# Patient Record
Sex: MALE | Race: OTHER
[De-identification: names, ages, dates, MRNs, and addresses within clinical notes are randomized per-mention and may not be internally consistent; named-entity substitution may affect disease eponyms.]

---

## 2022-05-29 ENCOUNTER — HOSPITAL ENCOUNTER (EMERGENCY)
Dept: HOSPITAL 61 - ER | Age: 42
Discharge: HOME | End: 2022-05-29
Payer: COMMERCIAL

## 2022-05-29 VITALS — WEIGHT: 242.51 LBS | BODY MASS INDEX: 38.97 KG/M2 | HEIGHT: 66 IN

## 2022-05-29 VITALS — SYSTOLIC BLOOD PRESSURE: 146 MMHG | DIASTOLIC BLOOD PRESSURE: 78 MMHG

## 2022-05-29 DIAGNOSIS — S90.02XA: ICD-10-CM
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
## 2022-05-29 DIAGNOSIS — S60.212A: ICD-10-CM

## 2022-05-29 DIAGNOSIS — M54.59: ICD-10-CM
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
## 2022-05-29 DIAGNOSIS — S60.211A: ICD-10-CM

## 2022-05-29 DIAGNOSIS — S90.01XA: ICD-10-CM

## 2022-05-29 DIAGNOSIS — S16.1XXA: Primary | ICD-10-CM
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
## 2022-05-29 PROCEDURE — 72131 CT LUMBAR SPINE W/O DYE: CPT

## 2022-05-29 PROCEDURE — 96372 THER/PROPH/DIAG INJ SC/IM: CPT

## 2022-05-29 PROCEDURE — 99284 EMERGENCY DEPT VISIT MOD MDM: CPT

## 2022-05-29 NOTE — RAD
CT LUMBAR SPINE WO



History: Fall down steps on to buttocks. Low back pain.



Technique: Noncontrast CT was performed of the lumbar spine. Multiplanar reconstructions were perform
ed.



Comparison: None



Findings: 

There are 5 nonrib-bearing lumbar vertebral segments. The L5 segment is partially sacralized with joceline
imentary disc and sacralization of the transverse processes.



There is no fracture or dislocation. Normal vertebral body heights are maintained. Mild central disc 
protrusion at L4-L5. Mild to moderate left neural foraminal stenosis at L4-L5.



Paraspinal soft tissues are unremarkable.



Impression: 

1.  No acute osseous abnormality in the lumbar spine.

2.  Degenerative changes greatest at L4-L5 central disc protrusion and left neural foraminal narrowin
g.

3.  Partial sacralization of the L5 vertebral body.





Exposure: One or more of the following individualized dose reduction techniques were utilized for thi
s examination:  

1. Automated exposure control

2. Adjustment of the mA and/or kV according to patient size

3. Use of iterative reconstruction technique.



Electronically signed by: Giles Xiong MD (5/29/2022 5:02 PM) [id number]

## 2022-05-29 NOTE — PHYS DOC
Past Medical History


Past Surgical History:  No Surgical History





General Adult


EDM:


Chief Complaint:  MECHANICAL FALL





HPI:


HPI:





Patient is a 41-year-old male who presents to the emergency department reporting

that while at work yesterday he slipped on a wet surface while walking down 

steps, states he fell down approximately 3-4 steps landing on his buttocks and 

while trying to catch his fall injured his upper extremities.  Patient states he

then stood back up and attempted to continue walking down the steps and slipped 

once again on the wet surface falling down approximately 3-4 steps, patient 

denies striking his head, denies loss of consciousness.  Patient does report 

left-sided neck pain, low back pain, bilateral upper extremity pain, bilateral 

lower extremity pain.  Patient reports that he did rhina 3 over-the-counter 

ibuprofens at 5 AM with minimal relief in pain, currently rates his pain scale 

at a 10 out of 10.  Patient denies taking prescription pain medications.  

Patient denies dizziness, syncopal or near syncopal episodes, denies chest pain 

or chest discomfort.  Patient denies a history of IV drug use, 

immunosuppression, cancers, fever/chills, numbness or tingling to his buttocks 

or genitals, bowel/bladder incontinence/retention.  Patient denies other 

physical complaints or physical concerns.





Review of Systems:


Review of Systems:


14 body systems of review of systems have been reviewed.  See HPI for pertinent 

positives and negative responses, otherwise all other systems are negative, nonp

ertinent or noncontributory.


Constitutional: Negative except as outlined in HPI above.


Skin: Negative except as outlined in HPI above.


Eyes:   Negative except as outlined in HPI above.


HENT: Negative except as outlined in HPI above.


Respiratory:   Negative except as outlined in HPI above.


Cardiovascular:   Negative except as outlined in HPI above.


GI:   Negative except as outlined in HPI above.


:  Negative except as outlined in HPI above.


Musculoskeletal:   Negative except as outlined in HPI above.


Integument:   Negative except as outlined in HPI above.


Neurologic:   Negative except as outlined in HPI above.


Endocrine:   Negative except as outlined in HPI above.


Lymphatic:  Negative except as outlined in HPI above.


Psychiatric:  Negative except as outlined in HPI above.





Heart Score:


C/O Chest Pain:  No


Risk Factors:


Risk Factors:  DM, Current or recent (<one month) smoker, HTN, HLP, family 

history of CAD, obesity.


Risk Scores:


Score 0 - 3:  2.5% MACE over next 6 weeks - Discharge Home


Score 4 - 6:  20.3% MACE over next 6 weeks - Admit for Clinical Observation


Score 7 - 10:  72.7% MACE over next 6 weeks - Early Invasive Strategies





Current Medications:





Current Medications








 Medications


  (Trade)  Dose


 Ordered  Sig/Harmony  Start Time


 Stop Time Status Last Admin


Dose Admin


 


 Ketorolac


 Tromethamine


  (Toradol Im)  60 mg  1X  ONCE  5/29/22 15:15


 5/29/22 15:16 DC 5/29/22 15:25


60 MG











Allergies:


Allergies:





Allergies








Coded Allergies Type Severity Reaction Last Updated Verified


 


  No Known Drug Allergies    5/29/22 No











Physical Exam:


PE:





Constitutional: Well developed, well nourished, no acute distress, non-toxic 

appearance.  41-year-old male in no apparent distress.


HENT: Normocephalic, atraumatic.  There is no malocclusion, no raccoon eyes, no 

battles sign.  Patient speaking in normal voice tones.


Eyes: Conjunctiva normal, no discharge.


Neck: Normal range of motion, no stridor.  There is no C-spine tenderness, there

is pain to the muscular structures of the left side of neck.


Cardiovascular: No cyanosis appreciated, distal cap refill less than 2 seconds.


Lungs & Thorax: Patient is in no respiratory distress, no audible adventitious 

lung sounds appreciated.


Abdomen: Nontender, no abnormalities noted.


Skin: Warm, dry, no erythema, no rash.  


Back: No tenderness, no deformities.


Extremities: No tenderness, no cyanosis, no clubbing, ROM intact, no edema.  

Intact 5/5 motor strength with hip flexion, knee flexion/extension/abduction, 

plantar/dorsiflexion at the ankle, and dorsiflexion of the toes bilaterally.  No

deformities are present.


Neurologic: Alert and oriented X 3, normal motor function, normal sensory 

function, no focal deficits noted. 


Psychologic: Affect normal, judgement normal, mood normal.





Current Patient Data:


Vital Signs:





                                   Vital Signs








  Date Time  Temp Pulse Resp B/P (MAP) Pulse Ox O2 Delivery O2 Flow Rate FiO2


 


5/29/22 14:47 98.5 86 18 150/92 (111) 96 Room Air  





 98.5       











EKG:


EKG:


[]





Radiology/Procedures:


Radiology/Procedures:


REASON: Fall down steps onto buttocks, low back pain


PROCEDURE: CT LUMBAR SPINE WO CONTRAST





CT LUMBAR SPINE WO





History: Fall down steps on to buttocks. Low back pain.





Technique: Noncontrast CT was performed of the lumbar spine. Multiplanar 

reconstructions were performed.





Comparison: None





Findings: 


There are 5 nonrib-bearing lumbar vertebral segments. The L5 segment is 

partially sacralized with rudimentary disc and sacralization of the transverse 

processes.





There is no fracture or dislocation. Normal vertebral body heights are 

maintained. Mild central disc protrusion at L4-L5. Mild to moderate left neural 

foraminal stenosis at L4-L5.





Paraspinal soft tissues are unremarkable.





Impression: 


1.  No acute osseous abnormality in the lumbar spine.


2.  Degenerative changes greatest at L4-L5 central disc protrusion and left 

neural foraminal narrowing.


3.  Partial sacralization of the L5 vertebral body.








Exposure: One or more of the following individualized dose reduction techniques 

were utilized for this examination:  


1. Automated exposure control


2. Adjustment of the mA and/or kV according to patient size


3. Use of iterative reconstruction technique.





Electronically signed by: Giles Xiong MD (5/29/2022 5:02 PM) [id number]





Course & Med Decision Making:


Course & Med Decision Making


Pertinent Labs and Imaging studies reviewed. (See chart for details)





41-year-old male, vital signs reviewed, presents to the emergency department 

concerning a slip and fall on a wet surface at work yesterday.  Physical 

examination is consistent with patient's explanation of events, I am suspicious 

for possible bony injury of L-spine, with verbalized history of falling onto 

buttocks down steps, will order CT imaging of lumbar spine.  There was no step-

offs, no skin discoloration of the lumbar spine apparent.  The patient does have

 intact motor strength with satisfactory hip flexion, knee flexion extension 

abduction, plantar and dorsiflexion of the ankle, dorsiflexion of toes bi

laterally, there is no saddle anesthesia, however reported trauma does 

necessitate emergent CT imaging of L-spine.  Will give IM pain medication, will 

reevaluate patient after completed imaging.





CT of the L-spine is nonconcerning for acute bony fracture.  There is evidence 

of degenerative changes with bulging disc between L4-L5.  Discussed findings 

with patient, discussed with patient will start on ibuprofen regimen, muscle 

relaxer cyclobenzaprine, discussed with patient do not drive, operate heavy 

machinery, or perform dangerous actions or activities while taking the muscle 

relaxer, also discussed with patient strict follow-up with company work comp 

physician as patient reports this did happen at work.  Also discussed with 

patient to let primary care provider know of this incident, return to ER 

precautions and concerns were discussed, patient gave verbal understanding of 

and is amenable to ED discharge planning.





Discussed with the patient all findings and diagnostic testing as well as the 

need to follow-up with their primary care provider for further evaluation and 

treatment or return to the ED if any new or worsening symptoms.  Strict return 

precautions were also discussed at length, the patient voiced understanding and 

agreement with the discharge planning.  The patient was nontoxic in appearance, 

in no apparent distress, and hemodynamically stable at the time of disposition.





Dragon Disclaimer:


Dragon Disclaimer:


This electronic medical record was generated, in whole or in part, using a voice

 recognition dictation system.





Departure


Departure


Impression:  


   Primary Impression:  


   Fall from slipping on wet surface


   Qualified Codes:  W01.0XXA - Fall on same level from slipping, tripping and 

   stumbling without subsequent striking against object, initial encounter


   Additional Impressions:  


   Low back pain


   Qualified Codes:  M54.50 - Low back pain, unspecified


   Neck muscle strain


   Qualified Codes:  S16.1XXA - Strain of muscle, fascia and tendon at neck 

   level, initial encounter


   Contusion of wrist, left


   Qualified Codes:  S60.212A - Contusion of left wrist, initial encounter


   Contusion of wrist, right


   Qualified Codes:  S60.211A - Contusion of right wrist, initial encounter


   Contusion of ankle, left


   Qualified Codes:  S90.02XA - Contusion of left ankle, initial encounter


   Contusion of ankle, right


   Qualified Codes:  S90.01XA - Contusion of right ankle, initial encounter


Disposition:  01 HOME / SELF CARE / HOMELESS


Condition:  GOOD


Referrals:  


CHRIST HOUGH MD (PCP)


Patient Instructions:  Back Pain, Adult, Contusion, Soft Tissue Injury of the 

Neck





Additional Instructions:  


You were seen today in the emergency department for pain to your low back, both 

wrists, left side of neck, both ankles after a slip and fall on steps at work.  

CT imaging of your low back did not show any concerning findings.  As we 

discussed I am prescribing you a regimen of ibuprofen, please take for aches and

 pains, I am also prescribing you a muscle relaxer, please take as directed as 

needed for muscle stiffness, please follow-up with your primary work comp 

providers soon, I will write you a work excuse to that you may be able to 

follow-up with your work comp physicians.  Please let your primary care 

physician Dr. Christ Hough know of this fall and injury so that she can follow-up 

with ongoing outpatient pain management.  Please use ice packs to your sore 

areas 30 minutes on and 30 minutes off while awake for the next 48 to 72 hours. 

 Thank you for visiting our Emergency Department.  It was a pleasure taking care

 of you today in the emergency department and we appreciate you trusting us with

 your care. If any additional problems come up don't hesitate to return to visit

 us. Please follow up with your primary care provider so they can plan 

additional care if needed and know about the problem that you had. If symptoms 

worsen come back to the Emergency Department. Any concerning symptoms that start

 such as chest pain, shortness of air, weakness or numbness on one side of the 

body, running high fevers or any other concerning symptoms return to the ER.


Scripts


Ibuprofen (IBUPROFEN) 600 Mg Tablet


600 MG PO PRN Q6HRS PRN for INFLAMMATION, #30 TAB 0 Refills


   Prov: ALESSANDRO BARRIOS         5/29/22 


Cyclobenzaprine Hcl (CYCLOBENZAPRINE HCL) 10 Mg Tablet


10 MG PO TID, #15 TAB 0 Refills


   Prov: ALESSANDRO BARRIOS         5/29/22











ALESSANDRO BARRIOS       May 29, 2022 16:11